# Patient Record
Sex: FEMALE
[De-identification: names, ages, dates, MRNs, and addresses within clinical notes are randomized per-mention and may not be internally consistent; named-entity substitution may affect disease eponyms.]

---

## 2020-05-18 ENCOUNTER — TRANSCRIPTION ENCOUNTER (OUTPATIENT)
Age: 55
End: 2020-05-18

## 2023-02-25 ENCOUNTER — EMERGENCY (EMERGENCY)
Facility: HOSPITAL | Age: 58
LOS: 1 days | Discharge: HOME | End: 2023-02-25
Attending: EMERGENCY MEDICINE
Payer: COMMERCIAL

## 2023-02-25 VITALS
TEMPERATURE: 98 F | HEIGHT: 66 IN | DIASTOLIC BLOOD PRESSURE: 72 MMHG | OXYGEN SATURATION: 99 % | HEART RATE: 97 BPM | SYSTOLIC BLOOD PRESSURE: 122 MMHG | RESPIRATION RATE: 18 BRPM | WEIGHT: 164.02 LBS

## 2023-02-25 LAB
ALBUMIN SERPL ELPH-MCNC: 4.1 G/DL — SIGNIFICANT CHANGE UP (ref 3.3–5)
ALP SERPL-CCNC: 83 U/L — SIGNIFICANT CHANGE UP (ref 40–120)
ALT FLD-CCNC: 14 U/L — SIGNIFICANT CHANGE UP (ref 10–45)
ANION GAP SERPL CALC-SCNC: 11 MMOL/L — SIGNIFICANT CHANGE UP (ref 5–17)
APTT BLD: 32.8 SEC — SIGNIFICANT CHANGE UP (ref 27.5–35.5)
AST SERPL-CCNC: 13 U/L — SIGNIFICANT CHANGE UP (ref 10–40)
BASOPHILS # BLD AUTO: 0.04 K/UL — SIGNIFICANT CHANGE UP (ref 0–0.2)
BASOPHILS NFR BLD AUTO: 0.6 % — SIGNIFICANT CHANGE UP (ref 0–2)
BILIRUB SERPL-MCNC: 0.5 MG/DL — SIGNIFICANT CHANGE UP (ref 0.2–1.2)
BUN SERPL-MCNC: 16 MG/DL — SIGNIFICANT CHANGE UP (ref 7–23)
CALCIUM SERPL-MCNC: 8.7 MG/DL — SIGNIFICANT CHANGE UP (ref 8.4–10.5)
CHLORIDE SERPL-SCNC: 106 MMOL/L — SIGNIFICANT CHANGE UP (ref 96–108)
CO2 SERPL-SCNC: 25 MMOL/L — SIGNIFICANT CHANGE UP (ref 22–31)
CREAT SERPL-MCNC: 0.69 MG/DL — SIGNIFICANT CHANGE UP (ref 0.5–1.3)
EGFR: 102 ML/MIN/1.73M2 — SIGNIFICANT CHANGE UP
EOSINOPHIL # BLD AUTO: 0.09 K/UL — SIGNIFICANT CHANGE UP (ref 0–0.5)
EOSINOPHIL NFR BLD AUTO: 1.3 % — SIGNIFICANT CHANGE UP (ref 0–6)
GLUCOSE SERPL-MCNC: 133 MG/DL — HIGH (ref 70–99)
HCT VFR BLD CALC: 29.7 % — LOW (ref 34.5–45)
HGB BLD-MCNC: 9.4 G/DL — LOW (ref 11.5–15.5)
IMM GRANULOCYTES NFR BLD AUTO: 0.3 % — SIGNIFICANT CHANGE UP (ref 0–0.9)
INR BLD: 1.03 RATIO — SIGNIFICANT CHANGE UP (ref 0.88–1.16)
LIDOCAIN IGE QN: 29 U/L — SIGNIFICANT CHANGE UP (ref 7–60)
LYMPHOCYTES # BLD AUTO: 1.61 K/UL — SIGNIFICANT CHANGE UP (ref 1–3.3)
LYMPHOCYTES # BLD AUTO: 22.7 % — SIGNIFICANT CHANGE UP (ref 13–44)
MCHC RBC-ENTMCNC: 28.7 PG — SIGNIFICANT CHANGE UP (ref 27–34)
MCHC RBC-ENTMCNC: 31.6 GM/DL — LOW (ref 32–36)
MCV RBC AUTO: 90.5 FL — SIGNIFICANT CHANGE UP (ref 80–100)
MONOCYTES # BLD AUTO: 0.38 K/UL — SIGNIFICANT CHANGE UP (ref 0–0.9)
MONOCYTES NFR BLD AUTO: 5.4 % — SIGNIFICANT CHANGE UP (ref 2–14)
NEUTROPHILS # BLD AUTO: 4.95 K/UL — SIGNIFICANT CHANGE UP (ref 1.8–7.4)
NEUTROPHILS NFR BLD AUTO: 69.7 % — SIGNIFICANT CHANGE UP (ref 43–77)
NRBC # BLD: 0 /100 WBCS — SIGNIFICANT CHANGE UP (ref 0–0)
PLATELET # BLD AUTO: 233 K/UL — SIGNIFICANT CHANGE UP (ref 150–400)
POTASSIUM SERPL-MCNC: 3.8 MMOL/L — SIGNIFICANT CHANGE UP (ref 3.5–5.3)
POTASSIUM SERPL-SCNC: 3.8 MMOL/L — SIGNIFICANT CHANGE UP (ref 3.5–5.3)
PROT SERPL-MCNC: 6.2 G/DL — SIGNIFICANT CHANGE UP (ref 6–8.3)
PROTHROM AB SERPL-ACNC: 12 SEC — SIGNIFICANT CHANGE UP (ref 10.5–13.4)
RBC # BLD: 3.28 M/UL — LOW (ref 3.8–5.2)
RBC # FLD: 13 % — SIGNIFICANT CHANGE UP (ref 10.3–14.5)
SODIUM SERPL-SCNC: 142 MMOL/L — SIGNIFICANT CHANGE UP (ref 135–145)
WBC # BLD: 7.09 K/UL — SIGNIFICANT CHANGE UP (ref 3.8–10.5)
WBC # FLD AUTO: 7.09 K/UL — SIGNIFICANT CHANGE UP (ref 3.8–10.5)

## 2023-02-25 PROCEDURE — 99285 EMERGENCY DEPT VISIT HI MDM: CPT

## 2023-02-25 NOTE — ED ADULT NURSE NOTE - OBJECTIVE STATEMENT
56 yo female PMH hypothyroidism, A&ox3, presents to ED c/o rectal bleeding.  Pt reports she had colonoscopy done this thursday, GI removed a polyp and since thursday has had rectal bleeding.  Denies LOC. BReathing even and unlabored, abdomen soft nontender, no pedal edema.  Pt denies chest pain, palpitations, shortness of breath, headache, visual disturbances, numbness/tingling, fever, chills, diaphoresis, nausea, vomiting, constipation, or urinary symptoms.

## 2023-02-25 NOTE — ED PROVIDER NOTE - ATTENDING CONTRIBUTION TO CARE
attending Ed: 57yF no PMH p/w 1 day of BRBPR after undergoing a screening colonoscopy 2 days ago at an outpatient facility.  She states that the colonoscopy found a single polyp which was sampled for biopsy as well as internal hemorrhoids.  +general weakness and some lower abdominal discomfort. Exam as above. Will obtain labs including type and screen, transfuse PRN, CT A/P assess for active bleeding, reassess

## 2023-02-25 NOTE — ED PROVIDER NOTE - CLINICAL SUMMARY MEDICAL DECISION MAKING FREE TEXT BOX
57-year-old female with no significant past medical history here for 1 day of bright red blood per rectum after undergoing a screening colonoscopy 2 days ago at an outpatient facility.  She states that the colonoscopy found a single polyp which was sampled for biopsy as well as internal hemorrhoids.  She endorses general weakness but denies chest pain or shortness of breath.  She also endorses diffuse abdominal pain more pronounced in the lower abdomen.    General: well appearing, alert, oriented to person, time, place  Psych: mood appropriate  Head: normocephalic; atraumatic  Eyes: conjunctivae clear bilaterally, sclerae anicteric  ENT: no nasal flaring, patent nares  Cardio: Skin warm and well-perfused  Resp: Normal respiratory effort; no accessory muscle use  GI: Tenderness to palpation in the right lower quadrant, periumbilical region, left lower quadrant; abdomen is soft and nondistended  : Exam deferred  Neuro: Normal sensation and movement  Skin: No rashes noted  MSK: Normal movement of extremities  Lymph/Vasc: no LE edema    57-year-old female here for possible gastrointestinal bleed in the setting of recent colonoscopy.  She is currently hemodynamically stable.  Will obtain CT scan of abdomen and pelvis with IV contrast to assess for extravasation.  We will also obtain CBC, CMP, type and screen, PT and PTT.

## 2023-02-25 NOTE — ED ADULT NURSE NOTE - NSIMPLEMENTINTERV_GEN_ALL_ED
Implemented All Universal Safety Interventions:  [place] [facility] to call system. Call bell, personal items and telephone within reach. Instruct patient to call for assistance. Room bathroom lighting operational. Non-slip footwear when patient is off stretcher. Physically safe environment: no spills, clutter or unnecessary equipment. Stretcher in lowest position, wheels locked, appropriate side rails in place.

## 2023-02-26 VITALS
SYSTOLIC BLOOD PRESSURE: 108 MMHG | DIASTOLIC BLOOD PRESSURE: 66 MMHG | TEMPERATURE: 98 F | RESPIRATION RATE: 18 BRPM | HEART RATE: 66 BPM | OXYGEN SATURATION: 100 %

## 2023-02-26 LAB
BASOPHILS # BLD AUTO: 0.03 K/UL — SIGNIFICANT CHANGE UP (ref 0–0.2)
BASOPHILS NFR BLD AUTO: 0.5 % — SIGNIFICANT CHANGE UP (ref 0–2)
BLD GP AB SCN SERPL QL: NEGATIVE — SIGNIFICANT CHANGE UP
EOSINOPHIL # BLD AUTO: 0.12 K/UL — SIGNIFICANT CHANGE UP (ref 0–0.5)
EOSINOPHIL NFR BLD AUTO: 2 % — SIGNIFICANT CHANGE UP (ref 0–6)
HCT VFR BLD CALC: 27.5 % — LOW (ref 34.5–45)
HCT VFR BLD CALC: 27.9 % — LOW (ref 34.5–45)
HCT VFR BLD CALC: 28.3 % — LOW (ref 34.5–45)
HGB BLD-MCNC: 8.7 G/DL — LOW (ref 11.5–15.5)
HGB BLD-MCNC: 8.9 G/DL — LOW (ref 11.5–15.5)
HGB BLD-MCNC: 9.1 G/DL — LOW (ref 11.5–15.5)
IMM GRANULOCYTES NFR BLD AUTO: 0.2 % — SIGNIFICANT CHANGE UP (ref 0–0.9)
LACTATE SERPL-SCNC: 0.5 MMOL/L — SIGNIFICANT CHANGE UP (ref 0.5–2)
LACTATE SERPL-SCNC: 2.5 MMOL/L — HIGH (ref 0.5–2)
LYMPHOCYTES # BLD AUTO: 2.25 K/UL — SIGNIFICANT CHANGE UP (ref 1–3.3)
LYMPHOCYTES # BLD AUTO: 37.3 % — SIGNIFICANT CHANGE UP (ref 13–44)
MCHC RBC-ENTMCNC: 28.3 PG — SIGNIFICANT CHANGE UP (ref 27–34)
MCHC RBC-ENTMCNC: 28.4 PG — SIGNIFICANT CHANGE UP (ref 27–34)
MCHC RBC-ENTMCNC: 28.7 PG — SIGNIFICANT CHANGE UP (ref 27–34)
MCHC RBC-ENTMCNC: 31.6 GM/DL — LOW (ref 32–36)
MCHC RBC-ENTMCNC: 31.9 GM/DL — LOW (ref 32–36)
MCHC RBC-ENTMCNC: 32.2 GM/DL — SIGNIFICANT CHANGE UP (ref 32–36)
MCV RBC AUTO: 88.6 FL — SIGNIFICANT CHANGE UP (ref 80–100)
MCV RBC AUTO: 89.3 FL — SIGNIFICANT CHANGE UP (ref 80–100)
MCV RBC AUTO: 89.9 FL — SIGNIFICANT CHANGE UP (ref 80–100)
MONOCYTES # BLD AUTO: 0.34 K/UL — SIGNIFICANT CHANGE UP (ref 0–0.9)
MONOCYTES NFR BLD AUTO: 5.6 % — SIGNIFICANT CHANGE UP (ref 2–14)
NEUTROPHILS # BLD AUTO: 3.28 K/UL — SIGNIFICANT CHANGE UP (ref 1.8–7.4)
NEUTROPHILS NFR BLD AUTO: 54.4 % — SIGNIFICANT CHANGE UP (ref 43–77)
NRBC # BLD: 0 /100 WBCS — SIGNIFICANT CHANGE UP (ref 0–0)
OB PNL STL: POSITIVE
PLATELET # BLD AUTO: 193 K/UL — SIGNIFICANT CHANGE UP (ref 150–400)
PLATELET # BLD AUTO: 209 K/UL — SIGNIFICANT CHANGE UP (ref 150–400)
PLATELET # BLD AUTO: 215 K/UL — SIGNIFICANT CHANGE UP (ref 150–400)
RBC # BLD: 3.06 M/UL — LOW (ref 3.8–5.2)
RBC # BLD: 3.15 M/UL — LOW (ref 3.8–5.2)
RBC # BLD: 3.17 M/UL — LOW (ref 3.8–5.2)
RBC # FLD: 13.1 % — SIGNIFICANT CHANGE UP (ref 10.3–14.5)
RBC # FLD: 13.2 % — SIGNIFICANT CHANGE UP (ref 10.3–14.5)
RBC # FLD: 13.2 % — SIGNIFICANT CHANGE UP (ref 10.3–14.5)
RH IG SCN BLD-IMP: POSITIVE — SIGNIFICANT CHANGE UP
SARS-COV-2 RNA SPEC QL NAA+PROBE: SIGNIFICANT CHANGE UP
WBC # BLD: 4.34 K/UL — SIGNIFICANT CHANGE UP (ref 3.8–10.5)
WBC # BLD: 4.92 K/UL — SIGNIFICANT CHANGE UP (ref 3.8–10.5)
WBC # BLD: 6.03 K/UL — SIGNIFICANT CHANGE UP (ref 3.8–10.5)
WBC # FLD AUTO: 4.34 K/UL — SIGNIFICANT CHANGE UP (ref 3.8–10.5)
WBC # FLD AUTO: 4.92 K/UL — SIGNIFICANT CHANGE UP (ref 3.8–10.5)
WBC # FLD AUTO: 6.03 K/UL — SIGNIFICANT CHANGE UP (ref 3.8–10.5)

## 2023-02-26 PROCEDURE — 85730 THROMBOPLASTIN TIME PARTIAL: CPT

## 2023-02-26 PROCEDURE — U0005: CPT

## 2023-02-26 PROCEDURE — U0003: CPT

## 2023-02-26 PROCEDURE — 85610 PROTHROMBIN TIME: CPT

## 2023-02-26 PROCEDURE — 85027 COMPLETE CBC AUTOMATED: CPT

## 2023-02-26 PROCEDURE — 83605 ASSAY OF LACTIC ACID: CPT

## 2023-02-26 PROCEDURE — 83690 ASSAY OF LIPASE: CPT

## 2023-02-26 PROCEDURE — 93005 ELECTROCARDIOGRAM TRACING: CPT

## 2023-02-26 PROCEDURE — 74177 CT ABD & PELVIS W/CONTRAST: CPT | Mod: MA

## 2023-02-26 PROCEDURE — 86900 BLOOD TYPING SEROLOGIC ABO: CPT

## 2023-02-26 PROCEDURE — 36415 COLL VENOUS BLD VENIPUNCTURE: CPT

## 2023-02-26 PROCEDURE — 74177 CT ABD & PELVIS W/CONTRAST: CPT | Mod: 26,MA

## 2023-02-26 PROCEDURE — 99236 HOSP IP/OBS SAME DATE HI 85: CPT

## 2023-02-26 PROCEDURE — 80053 COMPREHEN METABOLIC PANEL: CPT

## 2023-02-26 PROCEDURE — G0378: CPT

## 2023-02-26 PROCEDURE — 99284 EMERGENCY DEPT VISIT MOD MDM: CPT | Mod: 25

## 2023-02-26 PROCEDURE — 85025 COMPLETE CBC W/AUTO DIFF WBC: CPT

## 2023-02-26 PROCEDURE — 82272 OCCULT BLD FECES 1-3 TESTS: CPT

## 2023-02-26 PROCEDURE — 86901 BLOOD TYPING SEROLOGIC RH(D): CPT

## 2023-02-26 PROCEDURE — 86850 RBC ANTIBODY SCREEN: CPT

## 2023-02-26 RX ORDER — ACETAMINOPHEN 500 MG
1000 TABLET ORAL ONCE
Refills: 0 | Status: DISCONTINUED | OUTPATIENT
Start: 2023-02-26 | End: 2023-03-01

## 2023-02-26 RX ORDER — SODIUM CHLORIDE 9 MG/ML
500 INJECTION INTRAMUSCULAR; INTRAVENOUS; SUBCUTANEOUS ONCE
Refills: 0 | Status: COMPLETED | OUTPATIENT
Start: 2023-02-26 | End: 2023-02-26

## 2023-02-26 RX ADMIN — SODIUM CHLORIDE 500 MILLILITER(S): 9 INJECTION INTRAMUSCULAR; INTRAVENOUS; SUBCUTANEOUS at 00:53

## 2023-02-26 NOTE — ED CDU PROVIDER INITIAL DAY NOTE - ATTENDING APP SHARED VISIT CONTRIBUTION OF CARE
attending Ed: pt with rectal bleeding following polyp removal during screening colonoscopy. Plan for CDU for serial CBC q4, pain control, GI consult pending, transfuse PRN, vitals every 4 hours, frequent reevaluations

## 2023-02-26 NOTE — ED CDU PROVIDER DISPOSITION NOTE - ATTENDING APP SHARED VISIT CONTRIBUTION OF CARE
Attending MD Sena:   I personally have seen and examined this patient.  Physician assistant note reviewed and agree on plan of care and except where noted.  See below for details.     Seen in CDU [place] [facility]    57F with no reported contributory PMH/PSH/Meds, no known drug allergies sent to the CDU after presenting to the ED with bright red blood per rectum after colonoscopy with polyp with biopsy and internal hemorrhoids on 2/23/23.  Patient reports no repeat episodes of bloody or black stools since arrival to CDU.  Denies dizziness, chest pain, shortness of breath, abdominal pain, nausea, vomiting, diarrhea, urinary complaints. Denies easy bruising, hematuria, epistaxis, gingival bleeding.     Exam:   General: NAD  HENT: head NCAT, airway patent  Eyes: anicteric, no conjunctival injection   Lungs: lungs CTAB with good inspiratory effort, no wheezing, no rhonchi, no rales  Cardiac: +S1S2, no obvious m/r/g  GI: abdomen soft with +BS, NT, ND  : no CVAT  MSK: ranging neck and extremities freely   Neuro: moving all extremities spontaneously, nonfocal  Psych: normal mood and affect     A/P: 57F with bright red blood per rectum, suspect s/p biopsy during colonoscopy, hemodynamically stable, labs and imaging reviewed with patient, Hb 9.4--> (after IVFs) 8.7 --> 8.9 this AM.  Multiple attempts to all numbers listed on patient's colonoscopy sheet called (telephone sounded busy every time and fax number reports mailbox is full with no further options) and [email or web address] does not provide any other working numbers.  Patient eager for discharge.  Tolerating po.  Discussed with House GI fellow, who agrees that given stable H/H patient can follow up outpatient.  Fellow will add chart note.  If no other recommendations in chart note, patient stable for discharge. Follow up instructions given, importance of follow up emphasized, return to ED parameters reviewed and patient verbalized understanding.  All questions answered, all concerns addressed.

## 2023-02-26 NOTE — ED CDU PROVIDER INITIAL DAY NOTE - NS ED ROS FT
Constitutional: No fever or chills +generalized weakness   Eyes: No visual changes, eye pain   CV: No chest pain or lower extremity edema  Resp: No SOB no cough  GI: +abd pain. No nausea or vomiting. +rectal bleeding   : No dysuria, hematuria.   MSK: No musculoskeletal pain  Skin: No rash  Psych: No complaints   Neuro: No headache. No numbness or tingling.   Endo: No known diabetes

## 2023-02-26 NOTE — ED CDU PROVIDER INITIAL DAY NOTE - PROGRESS NOTE DETAILS
Patient now in CDU, states that last episode of rectal bleeding was around 12am/1am - Debi Renae PA-C no further episodes of rectal bleeding, tolerating water and abd pain resolved. 4th hgb now stable. discussed findings with patient and will plan for dc at this time. patient comfortable with plan to call GI tomorrow for follow up and understands we attempted to reach him on several numbers with no success and no answering service. patient and spouse demonstrate understanding of plan and of return precautions. will not wait for formal GI consult at this time as case presented to fellow who verbally agrees if hgb stable then outpatient follow up Is appropriate- America Luna PA-C

## 2023-02-26 NOTE — ED CDU PROVIDER DISPOSITION NOTE - NSFOLLOWUPINSTRUCTIONS_ED_ALL_ED_FT
[facility].     We recommend you follow up with your primary care provider within the next 2-3 days, please bring all of your results with you.     You can also follow up with **GI    Please return to the Emergency Department with new, worsening, or concerning symptoms, such as:  -Increasing/ persistent bleeding   -Worsening/severe abdominal pain  -Uncontrollable vomiting  -Dizziness, syncope  -Shortness of breath or trouble breathing  -Pressure, pain, tightness in chest  -Facial drooping, arm weakness, or speech difficulty     *More detailed information regarding your visit and discharge can be found by reviewing this packet [facility].     We recommend you follow up with your gastroenterologist within the next 2-3 days, please bring all of your results with you.     You can also follow up with our GI clinic if unable to reach your physician    Please return to the Emergency Department with new, worsening, or concerning symptoms, such as:  -Increasing/ persistent bleeding or passing large blood clots per rectum  -Worsening/severe abdominal pain  -Uncontrollable vomiting  -Dizziness, fainting  -Shortness of breath or trouble breathing  -Pressure, pain, tightness in chest      *More detailed information regarding your visit and discharge can be found by reviewing this packet

## 2023-02-26 NOTE — ED CDU PROVIDER DISPOSITION NOTE - PATIENT PORTAL LINK FT
You can access the FollowMyHealth Patient Portal offered by [facility] by registering at the following website: [email or web address]. By joining [facility]’s FollowMyHealth portal, you will also be able to view your health information using other applications (apps) compatible with our system.

## 2023-02-26 NOTE — ED CDU PROVIDER INITIAL DAY NOTE - OBJECTIVE STATEMENT
58 y/o F with PMH anemia and hypothyroidism presents to ED with episodes red blood per rectum since Friday night. Patient had a screening colonoscopy on Thursday, found to have hemorrhoids and a polyp which was biopsied. Had episode of rectal bleeding on Friday, with more frequent episodes starting Saturday afternoon. Patient also endorses upper abdominal pain starting around the same time as bleeding. Endorses general weakness. Last ate last night at 7PM. Denies vomiting, fever, urinary complaints, shortness of breath, chest pain. Denies blood thinner use. GI: Dr. Ortega Guardado  ED course: Occult feces+, H/H 9.4/29.7->8.7/27.5. CT abdomen negative for acute pathology. Unable to reach patient's gastroenterologist. Plan for serial H/H and GI consult in CDU.

## 2023-02-26 NOTE — ED ADULT NURSE REASSESSMENT NOTE - NS ED NURSE REASSESS COMMENT FT1
Pt received from JANE Rosas. Pt oriented to CDU & plan of care was discussed. Pt A&O x 4. Pt in CDU for trending lab and to monitor bleeding. Pt denies any chest pain, SOB, dizziness or palpitations. Patient verbalized upper abdomen pain 4/10. RN offered Tylenol. Patient refused at this time. V/S stable, pt afebrile,  IV in place, patent and free of signs of infiltration. Pt resting in bed. Safety & comfort measures maintained. Call bell in reach. Will continue to monitor. NPO in progress. Pt received from JANE Rosas. Pt oriented to CDU & plan of care was discussed. Pt A&O x 4. Pt in CDU for trending lab and to monitor bleeding. Pt denies any chest pain, SOB, dizziness or palpitations. Patient verbalized upper abdomen pain 4/10. RN offered Tylenol. Patient refused at this time. V/S stable, pt afebrile,  IV in place, patent and free of signs of infiltration. Pt resting in bed. Safety & comfort measures maintained. Call bell in reach. Will continue to monitor. NPO in progress.    Patient took her own thyroid medication. Okay to take as per PA orders.

## 2023-02-26 NOTE — ED CDU PROVIDER DISPOSITION NOTE - CLINICAL COURSE
56 y/o F with PMH anemia and hypothyroidism presents to ED with episodes red blood per rectum since Friday night. Patient had a screening colonoscopy on Thursday, found to have hemorrhoids and a polyp which was biopsied. Had episode of rectal bleeding on Friday, with more frequent episodes starting Saturday afternoon. Patient also endorses upper abdominal pain starting around the same time as bleeding. Endorses general weakness. Last ate last night at 7PM. Denies vomiting, fever, urinary complaints, shortness of breath, chest pain. Denies blood thinner use. GI: Dr. Ortega Guardado  ED course: Occult feces+, H/H 9.4/29.7->8.7/27.5. CT abdomen negative for acute pathology. Unable to reach patient's gastroenterologist. Plan for serial H/H and GI consult in CDU. 56 y/o F with PMH anemia and hypothyroidism presents to ED with episodes red blood per rectum since Friday night. Patient had a screening colonoscopy on Thursday, found to have hemorrhoids and a polyp which was biopsied. Had episode of rectal bleeding on Friday, with more frequent episodes starting Saturday afternoon. Patient also endorses upper abdominal pain starting around the same time as bleeding. Endorses general weakness. Last ate last night at 7PM. Denies vomiting, fever, urinary complaints, shortness of breath, chest pain. Denies blood thinner use. GI: Dr. Ortega Guardado  ED course: Occult feces+, H/H 9.4/29.7->8.7/27.5. CT abdomen negative for acute pathology. Unable to reach patient's gastroenterologist. Plan for serial H/H and GI consult in CDU.    patient remained HD stable in CDU overnight with no recurrence of GIB and complete resolution of abdominal pain. patient reassessed and abdomen now soft nontender and nondistended. hgb x 4 are stable. unable to reach patients outpatient gastro but discussed case with fellow verbally who indicates it is appropriate for patient to follow up outpatient. patient demonstrates understanding of plan for outpatient follow up as well as close return precautions to ED. case and plan discussed with Dr. Sena.

## 2023-02-26 NOTE — ED CDU PROVIDER DISPOSITION NOTE - NSFOLLOWUPCLINICS_GEN_ALL_ED_FT
Gastroenterology at [facility]  Gastroenterology  [address] [place] [id number]  Phone: [phone]  Fax:

## 2023-02-26 NOTE — ED ADULT NURSE REASSESSMENT NOTE - NS ED NURSE REASSESS COMMENT FT1
Received pt from JANE Cavanaugh, pt aox4, comfort and safety maintained, Pt in observation for rectal bleeding after colonoscopy, CBC lab draw q4. Vital signs stable. Denies any discomfort this AM. IV catheter in place with no complaints of discomfort. Bed in lowest position, call in bell in place, Will continue with the plan of care.

## 2023-02-26 NOTE — ED ADULT NURSE REASSESSMENT NOTE - NS ED NURSE REASSESS COMMENT FT1
Pt received D/C instructions, verbalized understanding, IV catheter removed with no signs of discomfort

## 2023-02-28 RX ORDER — LEVOTHYROXINE SODIUM 125 MCG
1 TABLET ORAL
Qty: 0 | Refills: 0 | DISCHARGE

## 2025-05-19 PROBLEM — Z00.00 ENCOUNTER FOR PREVENTIVE HEALTH EXAMINATION: Status: ACTIVE | Noted: 2025-05-19

## 2025-05-27 ENCOUNTER — APPOINTMENT (OUTPATIENT)
Dept: ENDOCRINOLOGY | Facility: CLINIC | Age: 60
End: 2025-05-27

## 2025-05-27 VITALS
HEIGHT: 64 IN | BODY MASS INDEX: 29.37 KG/M2 | SYSTOLIC BLOOD PRESSURE: 103 MMHG | DIASTOLIC BLOOD PRESSURE: 64 MMHG | WEIGHT: 172 LBS | HEART RATE: 62 BPM

## 2025-05-27 DIAGNOSIS — E06.3 AUTOIMMUNE THYROIDITIS: ICD-10-CM

## 2025-05-27 DIAGNOSIS — E88.819 INSULIN RESISTANCE, UNSPECIFIED: ICD-10-CM

## 2025-05-27 PROCEDURE — 99204 OFFICE O/P NEW MOD 45 MIN: CPT

## 2025-05-27 RX ORDER — LEVOTHYROXINE SODIUM 0.03 MG/1
25 TABLET ORAL
Qty: 90 | Refills: 3 | Status: ACTIVE | COMMUNITY
Start: 2025-05-27 | End: 1900-01-01

## 2025-05-27 RX ORDER — METFORMIN HYDROCHLORIDE 500 MG/1
500 TABLET, COATED ORAL TWICE DAILY
Qty: 60 | Refills: 5 | Status: ACTIVE | COMMUNITY
Start: 2025-05-27 | End: 1900-01-01